# Patient Record
Sex: MALE | Race: WHITE | NOT HISPANIC OR LATINO | URBAN - METROPOLITAN AREA
[De-identification: names, ages, dates, MRNs, and addresses within clinical notes are randomized per-mention and may not be internally consistent; named-entity substitution may affect disease eponyms.]

---

## 2021-07-01 ENCOUNTER — OUT OF OFFICE VISIT (OUTPATIENT)
Dept: URBAN - METROPOLITAN AREA MEDICAL CENTER 18 | Facility: MEDICAL CENTER | Age: 66
End: 2021-07-01
Payer: MEDICARE

## 2021-07-01 DIAGNOSIS — R13.13 PHARYNGEAL DYSPHAGIA: ICD-10-CM

## 2021-07-01 DIAGNOSIS — J38.6 SUBGLOTTIC STENOSIS: ICD-10-CM

## 2021-07-01 DIAGNOSIS — Z79.01 ANTICOAGULANT LONG-TERM USE: ICD-10-CM

## 2021-07-01 PROCEDURE — 99222 1ST HOSP IP/OBS MODERATE 55: CPT | Performed by: INTERNAL MEDICINE

## 2021-07-01 PROCEDURE — G8427 DOCREV CUR MEDS BY ELIG CLIN: HCPCS | Performed by: INTERNAL MEDICINE

## 2021-07-06 ENCOUNTER — OUT OF OFFICE VISIT (OUTPATIENT)
Dept: URBAN - METROPOLITAN AREA MEDICAL CENTER 18 | Facility: MEDICAL CENTER | Age: 66
End: 2021-07-06
Payer: MEDICARE

## 2021-07-06 DIAGNOSIS — R13.19 CERVICAL DYSPHAGIA: ICD-10-CM

## 2021-07-06 DIAGNOSIS — Z85.01 HISTORY OF ESOPHAGEAL CANCER: ICD-10-CM

## 2021-07-06 PROCEDURE — 99232 SBSQ HOSP IP/OBS MODERATE 35: CPT | Performed by: INTERNAL MEDICINE

## 2021-07-07 ENCOUNTER — OUT OF OFFICE VISIT (OUTPATIENT)
Dept: URBAN - METROPOLITAN AREA MEDICAL CENTER 18 | Facility: MEDICAL CENTER | Age: 66
End: 2021-07-07
Payer: MEDICARE

## 2021-07-07 DIAGNOSIS — R13.19 CERVICAL DYSPHAGIA: ICD-10-CM

## 2021-07-07 PROCEDURE — 43246 EGD PLACE GASTROSTOMY TUBE: CPT | Performed by: INTERNAL MEDICINE

## 2022-06-22 ENCOUNTER — TELEPHONE ENCOUNTER (OUTPATIENT)
Dept: URBAN - METROPOLITAN AREA CLINIC 80 | Facility: CLINIC | Age: 67
End: 2022-06-22

## 2022-06-23 ENCOUNTER — TELEPHONE ENCOUNTER (OUTPATIENT)
Dept: URBAN - METROPOLITAN AREA CLINIC 80 | Facility: CLINIC | Age: 67
End: 2022-06-23

## 2022-06-27 ENCOUNTER — OFFICE VISIT (OUTPATIENT)
Dept: URBAN - METROPOLITAN AREA CLINIC 80 | Facility: CLINIC | Age: 67
End: 2022-06-27

## 2022-07-01 ENCOUNTER — WEB ENCOUNTER (OUTPATIENT)
Dept: URBAN - METROPOLITAN AREA CLINIC 88 | Facility: CLINIC | Age: 67
End: 2022-07-01

## 2022-07-01 ENCOUNTER — LAB OUTSIDE AN ENCOUNTER (OUTPATIENT)
Dept: URBAN - METROPOLITAN AREA CLINIC 88 | Facility: CLINIC | Age: 67
End: 2022-07-01

## 2022-07-01 ENCOUNTER — OFFICE VISIT (OUTPATIENT)
Dept: URBAN - METROPOLITAN AREA CLINIC 88 | Facility: CLINIC | Age: 67
End: 2022-07-01
Payer: MEDICARE

## 2022-07-01 VITALS
HEIGHT: 69 IN | DIASTOLIC BLOOD PRESSURE: 73 MMHG | TEMPERATURE: 98.1 F | SYSTOLIC BLOOD PRESSURE: 141 MMHG | HEART RATE: 71 BPM | WEIGHT: 153 LBS | BODY MASS INDEX: 22.66 KG/M2

## 2022-07-01 DIAGNOSIS — R13.19 OTHER DYSPHAGIA: ICD-10-CM

## 2022-07-01 DIAGNOSIS — K94.23 PEG TUBE MALFUNCTION: ICD-10-CM

## 2022-07-01 PROCEDURE — 99213 OFFICE O/P EST LOW 20 MIN: CPT | Performed by: NURSE PRACTITIONER

## 2022-07-01 RX ORDER — CARVEDILOL 6.25 MG/1
TABLET, FILM COATED ORAL
Qty: 90 TABLET | Status: ACTIVE | COMMUNITY

## 2022-07-01 RX ORDER — ATORVASTATIN CALCIUM 80 MG/1
TAKE 1 TABLET BY MOUTH ONCE DAILY AT BEDTIME TABLET, FILM COATED ORAL
Qty: 90 EACH | Refills: 1 | Status: ACTIVE | COMMUNITY

## 2022-07-01 RX ORDER — ASPIRIN 81 MG/1
1 TABLET TABLET, COATED ORAL ONCE A DAY
Status: ACTIVE | COMMUNITY

## 2022-07-01 RX ORDER — CLOPIDOGREL 75 MG/1
1 TABLET TABLET, FILM COATED ORAL ONCE A DAY
Status: ACTIVE | COMMUNITY

## 2022-07-01 RX ORDER — LEVOTHYROXINE SODIUM 200 UG/1
TABLET ORAL
Qty: 20 TABLET | Status: ACTIVE | COMMUNITY

## 2022-07-01 NOTE — HPI-TODAY'S VISIT:
[facility]  [facility]  12TH FLOOR  [address] [facility] [address]  Phone: [phone]         [facility]  [facility]  3rd [place] [address]      Parent of Jesus Peace  [address] [facility] [address]    :  2017  MRN:  [id number]    Dear Parent of Jesus,    This letter is to report the test results from your most recent visit.  The results are normal unless described below.    Results for orders placed or performed during the hospital encounter of 22   X-ray Bone age hand pediatrics     Status: None    Narrative    XR HAND BONE AGE 2022 9:18 AM      HISTORY: Short stature (child)    COMPARISON: 2021    FINDINGS:   The patient's chronologic age is 4 years 6 months.  The patient's bone age is 5 years in the phalanges and 3 years 6  months in the carpus.   Two standard deviations of the mean for a male at this chronologic age  is 20 months.        Impression    IMPRESSION:   Normal phalangeal bone age.    MARIIA RODRIGUEZ MD         SYSTEM ID:  [id number]   Results for orders placed or performed in visit on 22   IGFBP-3     Status: None   Result Value Ref Range    IGF Binding Protein3 4.7 1.3 - 5.6 ug/mL    IGF Binding Protein 3 SD Score 1.1    Insulin-Like Growth Factor 1 Ped     Status: None   Result Value Ref Range    Insulin Growth Factor 1 (External) 63 38 - 253 ng/mL    Insulin Growth Factor I SD Score (External) -1.2 -2.0 - 2.0 SD    Narrative    Verified by Angela Marquez on 2022.   TSH     Status: Normal   Result Value Ref Range    TSH 1.11 0.40 - 4.00 mU/L   T4 free     Status: Normal   Result Value Ref Range    Free T4 1.10 0.76 - 1.46 ng/dL   Erythrocyte sedimentation rate auto     Status: Normal   Result Value Ref Range    Erythrocyte Sedimentation Rate 8 0 - 15 mm/hr   IgA [LAB73]     Status: Abnormal   Result Value  Patient and wife presents today to discuss exchanging PEG tube.  Patient has a hx of throat cancer s/p radiation treatment in 2003.  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  He sustained a acute Left MCA stroke on 06/21/2021 with signs of aspiratio noted after this.  As a result, EGD with PEG tube placement by Dr. Naqvi at [facility] on 07/07/2021.  Procedure revealed esophageal stricture at 14 cm that was less than 1 cm in length, irregular Z-line, normal stomach and placement of 20F PEG tube.  Voies at least 5 feedings a day via PEG without signs of leakage or irritation.  C/o inability of clean tue and appearance of tube at this time that has not improved despite adequate flushing.    Last colonoscopy was in 2012.  Reports normal Cologuard testing in 2021. Ref Range    Immunoglobulin A 224 (H) 27 - 195 mg/dL   Deamidated Giladin Peptide Destini IgA IgG [[id number]]     Status: Normal   Result Value Ref Range    Deamidated Gliadin Antibody IgA 2.7 <7.0 U/mL    Deamidated Gliadin Antibody IgG <0.6 <7.0 U/mL   Tissue transglutaminase destini IgA and IgG [[id number]]     Status: Normal   Result Value Ref Range    Tissue Transglutaminase Antibody IgA 0.9 <7.0 U/mL    Tissue Transglutaminase Antibody IgG <0.6 <7.0 U/mL   ACTH     Status: Normal   Result Value Ref Range    Adrenal Corticotropin 25 <47 pg/mL   Cortisol serum AM     Status: Normal   Result Value Ref Range    Cortisol 9.9 4.0 - 22.0 ug/dL       Results Review: Thyroid function tests and cortisol along with growth factors are within normal limits.         Based upon these test results, I recommend re-assessing Norbert's growth rate in six months.         Thank you for involving me in the care of your child.  Please contact me via calling my office or [employer] if there are any questions or concerns.      Sincerely,      Albina Jolly MD  Pediatric Endocrinology  [facility]  [phone]    CC  Summer Haro  [facility] [address]  [facility] [address]    WILY REYES

## 2022-07-06 ENCOUNTER — OFFICE VISIT (OUTPATIENT)
Dept: URBAN - METROPOLITAN AREA MEDICAL CENTER 42 | Facility: MEDICAL CENTER | Age: 67
End: 2022-07-06
Payer: MEDICARE

## 2022-07-06 DIAGNOSIS — K94.23 COMPLICATION OF FEEDING TUBE: ICD-10-CM

## 2022-07-06 PROCEDURE — 43762 RPLC GTUBE NO REVJ TRC: CPT | Performed by: INTERNAL MEDICINE

## 2022-07-25 ENCOUNTER — OFFICE VISIT (OUTPATIENT)
Dept: URBAN - METROPOLITAN AREA CLINIC 70 | Facility: CLINIC | Age: 67
End: 2022-07-25
Payer: MEDICARE

## 2022-07-25 ENCOUNTER — DASHBOARD ENCOUNTERS (OUTPATIENT)
Age: 67
End: 2022-07-25

## 2022-07-25 VITALS
TEMPERATURE: 97.5 F | HEIGHT: 69 IN | DIASTOLIC BLOOD PRESSURE: 77 MMHG | WEIGHT: 153 LBS | SYSTOLIC BLOOD PRESSURE: 167 MMHG | BODY MASS INDEX: 22.66 KG/M2

## 2022-07-25 DIAGNOSIS — K94.23 PEG TUBE MALFUNCTION: ICD-10-CM

## 2022-07-25 PROCEDURE — 99214 OFFICE O/P EST MOD 30 MIN: CPT | Performed by: REGISTERED NURSE

## 2022-07-25 RX ORDER — CARVEDILOL 6.25 MG/1
TABLET, FILM COATED ORAL
Qty: 90 TABLET | Status: ACTIVE | COMMUNITY

## 2022-07-25 RX ORDER — ASPIRIN 81 MG/1
1 TABLET TABLET, COATED ORAL ONCE A DAY
Status: ACTIVE | COMMUNITY

## 2022-07-25 RX ORDER — ATORVASTATIN CALCIUM 80 MG/1
TAKE 1 TABLET BY MOUTH ONCE DAILY AT BEDTIME TABLET, FILM COATED ORAL
Qty: 90 EACH | Refills: 1 | Status: ACTIVE | COMMUNITY

## 2022-07-25 RX ORDER — LEVOTHYROXINE SODIUM 200 UG/1
TABLET ORAL
Qty: 20 TABLET | Status: ACTIVE | COMMUNITY

## 2022-07-25 RX ORDER — CLOPIDOGREL 75 MG/1
1 TABLET TABLET, FILM COATED ORAL ONCE A DAY
Status: ACTIVE | COMMUNITY

## 2022-07-25 NOTE — HPI-TODAY'S VISIT:
PEG change was done 7/6/22. He complains that the bumper is too tight. Tube is working well with no leakage.

## 2022-07-25 NOTE — PHYSICAL EXAM GASTROINTESTINAL
Abdomen , soft, nontender, nondistended , no guarding or rigidity , no masses palpable , normal bowel sounds , Liver and Spleen , no hepatomegaly present , liver nontender  G-tube in place. Redness and skin irritation noted around site. Bumper loosened. Site cleaned with water and new dressing applied.

## 2022-07-25 NOTE — HPI-OTHER HISTORIES
Note from OV 7/1/22: Patient and wife presents today to discuss exchanging PEG tube.  Patient has a hx of throat cancer s/p radiation treatment in 2003.  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  He sustained a acute Left MCA stroke on 06/21/2021 with signs of aspiratio noted after this.  As a result, EGD with PEG tube placement by Dr. Naqvi at [facility] on 07/07/2021.  Procedure revealed esophageal stricture at 14 cm that was less than 1 cm in length, irregular Z-line, normal stomach and placement of 20F PEG tube.  Voies at least 5 feedings a day via PEG without signs of leakage or irritation.  C/o inability of clean tue and appearance of tube at this time that has not improved despite adequate flushing.    Last colonoscopy was in 2012.  Reports normal Cologuard testing in 2021.

## 2024-11-11 ENCOUNTER — OFFICE VISIT (OUTPATIENT)
Dept: URBAN - METROPOLITAN AREA CLINIC 70 | Facility: CLINIC | Age: 69
End: 2024-11-11
Payer: COMMERCIAL

## 2024-11-11 VITALS
HEART RATE: 76 BPM | TEMPERATURE: 98.5 F | DIASTOLIC BLOOD PRESSURE: 59 MMHG | HEIGHT: 69 IN | WEIGHT: 134.4 LBS | SYSTOLIC BLOOD PRESSURE: 94 MMHG | BODY MASS INDEX: 19.91 KG/M2

## 2024-11-11 DIAGNOSIS — K94.23 PEG TUBE MALFUNCTION: ICD-10-CM

## 2024-11-11 PROCEDURE — 43762 RPLC GTUBE NO REVJ TRC: CPT | Performed by: REGISTERED NURSE

## 2024-11-11 RX ORDER — ASPIRIN 81 MG/1
1 TABLET TABLET, COATED ORAL ONCE A DAY
Status: ACTIVE | COMMUNITY

## 2024-11-11 RX ORDER — CLOPIDOGREL 75 MG/1
1 TABLET TABLET, FILM COATED ORAL ONCE A DAY
Status: ACTIVE | COMMUNITY

## 2024-11-11 RX ORDER — LEVOTHYROXINE SODIUM 200 UG/1
TABLET ORAL
Qty: 20 TABLET | Status: ACTIVE | COMMUNITY

## 2024-11-11 RX ORDER — ATORVASTATIN CALCIUM 80 MG/1
TAKE 1 TABLET BY MOUTH ONCE DAILY AT BEDTIME TABLET, FILM COATED ORAL
Qty: 90 EACH | Refills: 1 | Status: ACTIVE | COMMUNITY

## 2024-11-11 RX ORDER — CARVEDILOL 6.25 MG/1
TABLET, FILM COATED ORAL
Qty: 90 TABLET | Status: ACTIVE | COMMUNITY

## 2024-11-11 NOTE — PHYSICAL EXAM EYES:
Conjuntiva and eyelids appear normal,  Sclerae : White without injection Script not printed, please call in

## 2025-04-11 ENCOUNTER — OFFICE VISIT (OUTPATIENT)
Dept: URBAN - METROPOLITAN AREA CLINIC 70 | Facility: CLINIC | Age: 70
End: 2025-04-11
Payer: COMMERCIAL

## 2025-04-11 DIAGNOSIS — K94.23 PEG TUBE MALFUNCTION: ICD-10-CM

## 2025-04-11 PROCEDURE — 43762 RPLC GTUBE NO REVJ TRC: CPT | Performed by: REGISTERED NURSE

## 2025-04-11 RX ORDER — LEVOTHYROXINE SODIUM 200 UG/1
TABLET ORAL
Qty: 20 TABLET | Status: ACTIVE | COMMUNITY

## 2025-04-11 RX ORDER — CLOPIDOGREL 75 MG/1
1 TABLET TABLET, FILM COATED ORAL ONCE A DAY
Status: ACTIVE | COMMUNITY

## 2025-04-11 RX ORDER — ASPIRIN 81 MG/1
1 TABLET TABLET, COATED ORAL ONCE A DAY
Status: ACTIVE | COMMUNITY

## 2025-04-11 RX ORDER — CARVEDILOL 6.25 MG/1
TABLET, FILM COATED ORAL
Qty: 90 TABLET | Status: ACTIVE | COMMUNITY

## 2025-04-11 RX ORDER — ATORVASTATIN CALCIUM 80 MG/1
TAKE 1 TABLET BY MOUTH ONCE DAILY AT BEDTIME TABLET, FILM COATED ORAL
Qty: 90 EACH | Refills: 1 | Status: ACTIVE | COMMUNITY

## 2025-04-11 NOTE — HPI-TODAY'S VISIT:
Pt has a hx of throat cancer, subglottic stenosis and CVA requiring the need for a PEG and trach. He is here today for PEG replacement. The tube is discolored and beginning to breakdown.